# Patient Record
Sex: FEMALE | Race: WHITE | NOT HISPANIC OR LATINO | URBAN - METROPOLITAN AREA
[De-identification: names, ages, dates, MRNs, and addresses within clinical notes are randomized per-mention and may not be internally consistent; named-entity substitution may affect disease eponyms.]

---

## 2018-07-26 ENCOUNTER — OFFICE (OUTPATIENT)
Dept: URBAN - METROPOLITAN AREA CLINIC 101 | Facility: CLINIC | Age: 48
End: 2018-07-26

## 2018-07-26 VITALS
DIASTOLIC BLOOD PRESSURE: 94 MMHG | HEIGHT: 60 IN | SYSTOLIC BLOOD PRESSURE: 125 MMHG | TEMPERATURE: 98.1 F | WEIGHT: 147 LBS | HEART RATE: 87 BPM

## 2018-07-26 DIAGNOSIS — K21.9 GASTRO-ESOPHAGEAL REFLUX DISEASE WITHOUT ESOPHAGITIS: ICD-10-CM

## 2018-07-26 DIAGNOSIS — Z83.71 FAMILY HISTORY OF COLONIC POLYPS: ICD-10-CM

## 2018-07-26 DIAGNOSIS — R19.7 DIARRHEA, UNSPECIFIED: ICD-10-CM

## 2018-07-26 PROCEDURE — 99204 OFFICE O/P NEW MOD 45 MIN: CPT

## 2018-07-26 NOTE — SERVICEHPINOTES
CHRIST PETERSEN   is a   48   year old    female who is being seen in consultation at the request of   JULI STRICKLAND   for GERD and diarrhea. Diarrhea after eating out and in the mornings. BMs 1-2x/day, sometimes skips a day BSS type 4, 5, 7. Diarrhea has been intermittent for years but has gotten worse within the past year. No abdominal pain, blood in the stool. No family hx of IBD or colon cancer mother had adenomatous polyps. She reports some weight gain. No fever or chills. No recent travel or antibiotic.  Appetite is good sometimes is hungry 1 hour after she eats.She also reports she was dx with GERD approx. 20 years ago was able to control it with diet and take prevacid prn. 1.5 years ago had c6/c7 fusion, had vocal cord paralysis following and GERD has gotten worse since. She has been taking daily Tums which helps she tries to take Prevacid at onset of symptoms which does not help as much as Tums. She also reports she has to eat every 3 hours or else she will get a "knot" in her throat and will start vomiting. She can only drinks out of a straw because she cant tilt her head back due to vocal cords/neck no overt dysphagia to solids. She drinks 2L of regular pepsi daily and will also drink iced tea. She states she cannot drink very much water because "water gives her heartburn". She is a smoker,1/2 ppd, 20+ years. She also takes NSAIDs daily for arthritis. Usually takes 2 Tylenol and 2 ibuprofen at bedtime tries not to take anything during the day.

## 2018-09-14 ENCOUNTER — ON CAMPUS - OUTPATIENT (OUTPATIENT)
Dept: URBAN - METROPOLITAN AREA HOSPITAL 35 | Facility: HOSPITAL | Age: 48
End: 2018-09-14

## 2018-09-14 DIAGNOSIS — K21.9 GASTRO-ESOPHAGEAL REFLUX DISEASE WITHOUT ESOPHAGITIS: ICD-10-CM

## 2018-09-14 DIAGNOSIS — K20.8 OTHER ESOPHAGITIS: ICD-10-CM

## 2018-09-14 DIAGNOSIS — K29.80 DUODENITIS WITHOUT BLEEDING: ICD-10-CM

## 2018-09-14 DIAGNOSIS — K29.60 OTHER GASTRITIS WITHOUT BLEEDING: ICD-10-CM

## 2018-09-14 PROCEDURE — 43239 EGD BIOPSY SINGLE/MULTIPLE: CPT
